# Patient Record
Sex: FEMALE | Race: BLACK OR AFRICAN AMERICAN | Employment: FULL TIME | URBAN - METROPOLITAN AREA
[De-identification: names, ages, dates, MRNs, and addresses within clinical notes are randomized per-mention and may not be internally consistent; named-entity substitution may affect disease eponyms.]

---

## 2018-09-17 ENCOUNTER — HOSPITAL ENCOUNTER (EMERGENCY)
Age: 33
Discharge: HOME | End: 2018-09-18
Attending: EMERGENCY MEDICINE
Payer: MEDICAID

## 2018-09-17 DIAGNOSIS — N76.0 BV (BACTERIAL VAGINOSIS): Primary | ICD-10-CM

## 2018-09-17 DIAGNOSIS — O46.90 VAGINAL BLEEDING IN PREGNANCY: ICD-10-CM

## 2018-09-17 DIAGNOSIS — O20.0 THREATENED MISCARRIAGE: ICD-10-CM

## 2018-09-17 DIAGNOSIS — B96.89 BV (BACTERIAL VAGINOSIS): Primary | ICD-10-CM

## 2018-09-17 DIAGNOSIS — R53.83 FATIGUE, UNSPECIFIED TYPE: ICD-10-CM

## 2018-09-17 LAB
ANION GAP BLD CALC-SCNC: 19 MMOL/L (ref 10–20)
APPEARANCE UR: ABNORMAL
BACTERIA URNS QL MICRO: NEGATIVE /HPF
BILIRUB UR QL: NEGATIVE
BUN BLD-MCNC: 4 MG/DL (ref 9–20)
CA-I BLD-MCNC: 1.21 MMOL/L (ref 1.12–1.32)
CHLORIDE BLD-SCNC: 98 MMOL/L (ref 98–107)
CLUE CELLS VAG QL WET PREP: NORMAL
CO2 BLD-SCNC: 23 MMOL/L (ref 21–32)
COLOR UR: ABNORMAL
CREAT BLD-MCNC: 0.5 MG/DL (ref 0.6–1.3)
EPITH CASTS URNS QL MICRO: ABNORMAL /LPF
GLUCOSE BLD-MCNC: 92 MG/DL (ref 65–100)
GLUCOSE UR STRIP.AUTO-MCNC: NEGATIVE MG/DL
HCG UR QL: POSITIVE
HCT VFR BLD CALC: 40 % (ref 35–47)
HGB UR QL STRIP: ABNORMAL
KETONES UR QL STRIP.AUTO: NEGATIVE MG/DL
KOH PREP SPEC: NORMAL
LEUKOCYTE ESTERASE UR QL STRIP.AUTO: NEGATIVE
NITRITE UR QL STRIP.AUTO: NEGATIVE
OTHER,OTHU: ABNORMAL
PH UR STRIP: 7.5 [PH] (ref 5–8)
POTASSIUM BLD-SCNC: 4 MMOL/L (ref 3.5–5.1)
PROT UR STRIP-MCNC: NEGATIVE MG/DL
RBC #/AREA URNS HPF: ABNORMAL /HPF (ref 0–5)
SERVICE CMNT-IMP: ABNORMAL
SERVICE CMNT-IMP: NORMAL
SODIUM BLD-SCNC: 135 MMOL/L (ref 136–145)
SP GR UR REFRACTOMETRY: 1.02 (ref 1–1.03)
T VAGINALIS VAG QL WET PREP: NORMAL
UA: UC IF INDICATED,UAUC: ABNORMAL
UROBILINOGEN UR QL STRIP.AUTO: 0.2 EU/DL (ref 0.2–1)
WBC URNS QL MICRO: ABNORMAL /HPF (ref 0–4)

## 2018-09-17 PROCEDURE — 80047 BASIC METABLC PNL IONIZED CA: CPT

## 2018-09-17 PROCEDURE — 87210 SMEAR WET MOUNT SALINE/INK: CPT | Performed by: PHYSICIAN ASSISTANT

## 2018-09-17 PROCEDURE — 99284 EMERGENCY DEPT VISIT MOD MDM: CPT

## 2018-09-17 PROCEDURE — 81025 URINE PREGNANCY TEST: CPT

## 2018-09-17 PROCEDURE — 81001 URINALYSIS AUTO W/SCOPE: CPT | Performed by: PHYSICIAN ASSISTANT

## 2018-09-17 PROCEDURE — 87491 CHLMYD TRACH DNA AMP PROBE: CPT | Performed by: PHYSICIAN ASSISTANT

## 2018-09-18 ENCOUNTER — APPOINTMENT (OUTPATIENT)
Dept: ULTRASOUND IMAGING | Age: 33
End: 2018-09-18
Attending: PHYSICIAN ASSISTANT
Payer: MEDICAID

## 2018-09-18 VITALS
HEIGHT: 61 IN | TEMPERATURE: 98.2 F | RESPIRATION RATE: 16 BRPM | BODY MASS INDEX: 22.84 KG/M2 | DIASTOLIC BLOOD PRESSURE: 72 MMHG | HEART RATE: 77 BPM | OXYGEN SATURATION: 99 % | SYSTOLIC BLOOD PRESSURE: 116 MMHG | WEIGHT: 121 LBS

## 2018-09-18 LAB
ERYTHROCYTE [DISTWIDTH] IN BLOOD BY AUTOMATED COUNT: 14.2 % (ref 11.5–14.5)
HCG SERPL-ACNC: ABNORMAL MIU/ML (ref 0–6)
HCT VFR BLD AUTO: 37.2 % (ref 35–47)
HGB BLD-MCNC: 12.5 G/DL (ref 11.5–16)
MCH RBC QN AUTO: 31.3 PG (ref 26–34)
MCHC RBC AUTO-ENTMCNC: 33.6 G/DL (ref 30–36.5)
MCV RBC AUTO: 93.2 FL (ref 80–99)
NRBC # BLD: 0 K/UL (ref 0–0.01)
NRBC BLD-RTO: 0 PER 100 WBC
PLATELET # BLD AUTO: 281 K/UL (ref 150–400)
PMV BLD AUTO: 9.2 FL (ref 8.9–12.9)
RBC # BLD AUTO: 3.99 M/UL (ref 3.8–5.2)
WBC # BLD AUTO: 9 K/UL (ref 3.6–11)

## 2018-09-18 PROCEDURE — 85027 COMPLETE CBC AUTOMATED: CPT | Performed by: PHYSICIAN ASSISTANT

## 2018-09-18 PROCEDURE — 96372 THER/PROPH/DIAG INJ SC/IM: CPT

## 2018-09-18 PROCEDURE — 36415 COLL VENOUS BLD VENIPUNCTURE: CPT | Performed by: PHYSICIAN ASSISTANT

## 2018-09-18 PROCEDURE — 76801 OB US < 14 WKS SINGLE FETUS: CPT

## 2018-09-18 PROCEDURE — 84702 CHORIONIC GONADOTROPIN TEST: CPT | Performed by: PHYSICIAN ASSISTANT

## 2018-09-18 PROCEDURE — 96374 THER/PROPH/DIAG INJ IV PUSH: CPT

## 2018-09-18 PROCEDURE — 74011250636 HC RX REV CODE- 250/636: Performed by: PHYSICIAN ASSISTANT

## 2018-09-18 RX ORDER — ONDANSETRON 4 MG/1
4 TABLET, ORALLY DISINTEGRATING ORAL
Qty: 10 TAB | Refills: 0 | Status: SHIPPED | OUTPATIENT
Start: 2018-09-18 | End: 2020-10-20 | Stop reason: ALTCHOICE

## 2018-09-18 RX ORDER — CLINDAMYCIN HYDROCHLORIDE 300 MG/1
300 CAPSULE ORAL 4 TIMES DAILY
Qty: 40 CAP | Refills: 0 | Status: SHIPPED | OUTPATIENT
Start: 2018-09-18 | End: 2020-10-20 | Stop reason: ALTCHOICE

## 2018-09-18 RX ORDER — CLINDAMYCIN HYDROCHLORIDE 300 MG/1
300 CAPSULE ORAL 2 TIMES DAILY
Qty: 14 CAP | Refills: 0 | Status: SHIPPED | OUTPATIENT
Start: 2018-09-18 | End: 2018-09-25

## 2018-09-18 RX ORDER — ONDANSETRON 2 MG/ML
4 INJECTION INTRAMUSCULAR; INTRAVENOUS
Status: COMPLETED | OUTPATIENT
Start: 2018-09-18 | End: 2018-09-18

## 2018-09-18 RX ADMIN — ONDANSETRON HYDROCHLORIDE 4 MG: 2 INJECTION, SOLUTION INTRAMUSCULAR; INTRAVENOUS at 00:10

## 2018-09-18 RX ADMIN — HUMAN RHO(D) IMMUNE GLOBULIN 0.05 MG: 300 INJECTION, SOLUTION INTRAMUSCULAR at 01:49

## 2018-09-18 NOTE — ED NOTES
Physician has not talked to pt since results of U/S. Pt asked for her discharge instructions and Rx. This RN advised her that I had not received discharge instructions and the physician needed to speak to her about her results from the U/S. Pt became verbally loud about the doctor not coming in to see her or talk to her. I advised the pt that I would go and ask the physician to come speak with her now. The pt became very hostile and walked out of the ED. Pt returned through the open door and asked again if I would give her the Rx. I advised her that I had to get them from the physician. Pt stated she was leaving. Pt was ask to left me that her IV out. Pt stated that she had already taken it out. I was unable to observe the IV sight because the pt had her sleeves pulled down. PT left ED. No IV cath was found in the room nor any sign of any bleeding. Pt left the hospital grounds.

## 2018-09-18 NOTE — ED PROVIDER NOTES
EMERGENCY DEPARTMENT HISTORY AND PHYSICAL EXAM 
 
 
Date: 2018 Patient Name: Tavo Fountain History of Presenting Illness Chief Complaint Patient presents with  Fatigue  
  with nausea, reports miscarriage last Thursday was 9 weeks pregnant  Vaginal Discharge  
  with odor History Provided By: Patient HPI: Tavo Fountain, 29 y.o. female with no significant PMHx, presents ambulatory to the ED with cc of odorous vaginal discharge and fatigue x 4 days that worsened today. Pt additionally c/o nausea, vomiting, chills, and diaphoresis. Pt reports she went to [facility] a week ago where they did an US and informed her she was 8 weeks pregnant last Monday. Pt discloses that she had a miscarriage that Thursday while at home but was not seen at that time. Pt reports having vaginal bleeding for that one day, she had some spotting prior but none over the weekend or currently. Pt discloses she a f/u appointment in 3 days. Pt doesn't remember her LMP and discloses she's had an irregular cycle for the past 7 months. Pt denies HA, CP, SOB, cough, abdominal pain, diarrhea, or urinary sxs. There are no other complaints, changes, or physical findings at this time. PCP: None Past History Past Medical History: 
History reviewed. No pertinent past medical history. Past Surgical History: 
Past Surgical History:  
Procedure Laterality Date  HX GYN    
   Family History: 
History reviewed. No pertinent family history. Social History: 
Social History Substance Use Topics  Smoking status: Never Smoker  Smokeless tobacco: Never Used  Alcohol use Yes Comment: rare when not pregnant Allergies: Allergies Allergen Reactions  Latex Rash  Sulfa (Sulfonamide Antibiotics) Hives Review of Systems Review of Systems Constitutional: Positive for chills, diaphoresis and fatigue. Negative for fever. HENT: Negative for sore throat. Eyes: Negative for photophobia and redness. Respiratory: Negative for shortness of breath and wheezing. Cardiovascular: Negative for chest pain and leg swelling. Gastrointestinal: Positive for nausea and vomiting. Negative for abdominal pain and blood in stool. Genitourinary: Positive for vaginal discharge. Negative for difficulty urinating, dysuria, hematuria, menstrual problem and vaginal bleeding. Musculoskeletal: Negative for back pain and joint swelling. Neurological: Negative for dizziness, seizures, syncope, speech difficulty, weakness, numbness and headaches. Hematological: Negative for adenopathy. Psychiatric/Behavioral: Negative for agitation, confusion and suicidal ideas. The patient is not nervous/anxious. All other systems reviewed and are negative. Physical Exam  
Physical Exam  
Constitutional: She is oriented to person, place, and time. She appears well-developed and well-nourished. No distress. HENT:  
Head: Normocephalic and atraumatic. Eyes: Conjunctivae are normal.  
Cardiovascular: Normal rate, regular rhythm and normal heart sounds. Pulmonary/Chest: Effort normal and breath sounds normal. No respiratory distress. She has no wheezes. She has no rales. Abdominal: Soft. Bowel sounds are normal. She exhibits no distension. There is tenderness in the suprapubic area. There is no rebound and no guarding. Genitourinary: Pelvic exam was performed with patient supine. Cervix exhibits discharge (foul blood tinged whitish discharge). Cervix exhibits no motion tenderness. Right adnexum displays no tenderness. Left adnexum displays no tenderness. Vaginal discharge found. Neurological: She is alert and oriented to person, place, and time. Skin: Skin is warm and dry. Psychiatric: She has a normal mood and affect. Her behavior is normal. Judgment and thought content normal.  
Nursing note and vitals reviewed.  
at 12:35 AM 
 
 Diagnostic Study Results Labs - Recent Results (from the past 12 hour(s)) POC CHEM8 Collection Time: 09/17/18 11:05 PM  
Result Value Ref Range Calcium, ionized (POC) 1.21 1.12 - 1.32 mmol/L Sodium (POC) 135 (L) 136 - 145 mmol/L Potassium (POC) 4.0 3.5 - 5.1 mmol/L Chloride (POC) 98 98 - 107 mmol/L  
 CO2 (POC) 23 21 - 32 mmol/L Anion gap (POC) 19 10 - 20 mmol/L Glucose (POC) 92 65 - 100 mg/dL BUN (POC) 4 (L) 9 - 20 mg/dL Creatinine (POC) 0.5 (L) 0.6 - 1.3 mg/dL GFRAA, POC >60 >60 ml/min/1.73m2 GFRNA, POC >60 >60 ml/min/1.73m2 Hematocrit (POC) 40 35.0 - 47.0 % Comment Comment Not Indicated. DOROTHY, OTHER SOURCES Collection Time: 09/17/18 11:15 PM  
Result Value Ref Range Special Requests: NO SPECIAL REQUESTS    
 KOH NO YEAST SEEN    
WET PREP Collection Time: 09/17/18 11:15 PM  
Result Value Ref Range Clue cells CLUE CELLS PRESENT Wet prep NO TRICHOMONAS SEEN    
URINALYSIS W/ REFLEX CULTURE Collection Time: 09/17/18 11:15 PM  
Result Value Ref Range Color YELLOW/STRAW Appearance CLOUDY (A) CLEAR Specific gravity 1.020 1.003 - 1.030    
 pH (UA) 7.5 5.0 - 8.0 Protein NEGATIVE  NEG mg/dL Glucose NEGATIVE  NEG mg/dL Ketone NEGATIVE  NEG mg/dL Bilirubin NEGATIVE  NEG Blood SMALL (A) NEG Urobilinogen 0.2 0.2 - 1.0 EU/dL Nitrites NEGATIVE  NEG Leukocyte Esterase NEGATIVE  NEG    
 WBC 0-4 0 - 4 /hpf  
 RBC 0-5 0 - 5 /hpf Epithelial cells FEW FEW /lpf Bacteria NEGATIVE  NEG /hpf  
 UA:UC IF INDICATED CULTURE NOT INDICATED BY UA RESULT CNI Other: Renal Epithelial cells Present HCG URINE, QL. - POC Collection Time: 09/17/18 11:33 PM  
Result Value Ref Range Pregnancy test,urine (POC) POSITIVE (A) NEG    
CBC W/O DIFF Collection Time: 09/18/18 12:09 AM  
Result Value Ref Range WBC 9.0 3.6 - 11.0 K/uL  
 RBC 3.99 3.80 - 5.20 M/uL  
 HGB 12.5 11.5 - 16.0 g/dL HCT 37.2 35.0 - 47.0 % MCV 93.2 80.0 - 99.0 FL  
 MCH 31.3 26.0 - 34.0 PG  
 MCHC 33.6 30.0 - 36.5 g/dL  
 RDW 14.2 11.5 - 14.5 % PLATELET 226 453 - 605 K/uL MPV 9.2 8.9 - 12.9 FL  
 NRBC 0.0 0  WBC ABSOLUTE NRBC 0.00 0.00 - 0.01 K/uL BETA HCG, QT Collection Time: 09/18/18 12:09 AM  
Result Value Ref Range Beta HCG, QT >911758 (H) 0 - 6 MIU/ML  
RH IMMUNE GLOBULIN PROPHYLACTIC Collection Time: 09/18/18 12:15 AM  
Result Value Ref Range No. of weeks gestation 8 Unit number [id number] Blood component type RH IMMUNE GLOBULIN Unit division 00 Status of unit ISSUED Radiologic Studies -  
US PREG UTS < 14 WKS SNGL Final Result CT Results  (Last 48 hours) None CXR Results  (Last 48 hours) None Medical Decision Making I am the first provider for this patient. I reviewed the vital signs, available nursing notes, past medical history, past surgical history, family history and social history. Vital Signs-Reviewed the patient's vital signs. Patient Vitals for the past 12 hrs: 
 Temp Pulse Resp BP SpO2  
09/18/18 0150 - 77 16 116/72 99 % 09/17/18 2234 98.2 °F (36.8 °C) 75 16 110/61 98 % Records Reviewed: Nursing Notes and Old Medical Records Provider Notes (Medical Decision Making): DDx: anemia, UTI, STI, retained POC, STI, BV 
 
ED Course:  
Initial assessment performed. The patients presenting problems have been discussed, and they are in agreement with the care plan formulated and outlined with them. I have encouraged them to ask questions as they arise throughout their visit. 12:00 AM 
Pt initially refusing another stick as initial CBC ordered overlooked. Had long discussion with pt about her symptoms and f/u care. Pt advise we can do U/S now but there is wait to call in tech and blood work needed to check anemia.    Pt does report hx of retained POC in past.  Pt offered U/S now or with her f/u. Pt reports blood type O neg. And states she has had rhogam before. Pt is a A3 
 
1:07 AM 
Pt to U/S, care turned over to attending, Dr Kasey Nevarez at this time who will f/u on dispo and plan Critical Care Time: 0 Minutes Disposition: PLAN: 
1. The patient eloped. 2:50 AM 
The patient, at this time, has decided to leave the ED unsupervised, unnoticed and/or prior to his/her scheduled discharge. PLAN: 
1. Current Discharge Medication List  
  
START taking these medications Details  
clindamycin (CLEOCIN) 300 mg capsule Take 1 Cap by mouth two (2) times a day for 7 days. Qty: 14 Cap, Refills: 0  
  
  
 
2. Follow-up Information Follow up With Details Comments Contact Tristan Lambert Schedule an appointment as soon as possible for a visit on 2018  [address] [place] [address]. [address] [place] [id number] [phone] Fabi Michael MD Schedule an appointment as soon as possible for a visit in 1 day  [address] [place] [address] 
[phone] Return to ED if worse Diagnosis Clinical Impression: 1. BV (bacterial vaginosis) 2. Fatigue, unspecified type 3. Vaginal bleeding in pregnancy 4. Threatened miscarriage Attestations: This note is prepared by Tracey Dunn, acting as Scribe for [employer]. [employer]: The scribe's documentation has been prepared under my direction and personally reviewed by me in its entirety. I confirm that the note above accurately reflects all work, treatment, procedures, and medical decision making performed by me.

## 2018-09-18 NOTE — ED TRIAGE NOTES
I was in this chart to answer a patient phone call and print her AVS.  I was not able to print from [employer] computer so [address]

## 2018-09-18 NOTE — DISCHARGE INSTRUCTIONS
Bacterial Vaginosis: Care Instructions  Your Care Instructions    Bacterial vaginosis is a type of vaginal infection. It is caused by excess growth of certain bacteria that are normally found in the vagina. Symptoms can include itching, swelling, pain when you urinate or have sex, and a gray or yellow discharge with a \"fishy\" odor. It is not considered an infection that is spread through sexual contact. Although symptoms can be annoying and uncomfortable, bacterial vaginosis does not usually cause other health problems. However, if you have it while you are pregnant, it can cause complications. While the infection may go away on its own, most doctors use antibiotics to treat it. You may have been prescribed pills or vaginal cream. With treatment, bacterial vaginosis usually clears up in 5 to 7 days. Follow-up care is a key part of your treatment and safety. Be sure to make and go to all appointments, and call your doctor if you are having problems. It's also a good idea to know your test results and keep a list of the medicines you take. How can you care for yourself at home? · Take your antibiotics as directed. Do not stop taking them just because you feel better. You need to take the full course of antibiotics. · Do not eat or drink anything that contains alcohol if you are taking metronidazole (Flagyl). · Keep using your medicine if you start your period. Use pads instead of tampons while using a vaginal cream or suppository. Tampons can absorb the medicine. · Wear loose cotton clothing. Do not wear nylon and other materials that hold body heat and moisture close to the skin. · Do not scratch. Relieve itching with a cold pack or a cool bath. · Do not wash your vaginal area more than once a day. Use plain water or a mild, unscented soap. Do not douche. When should you call for help?   Watch closely for changes in your health, and be sure to contact your doctor if:    · You have unexpected vaginal bleeding.     · You have a fever.     · You have new or increased pain in your vagina or pelvis.     · You are not getting better after 1 week.     · Your symptoms return after you finish the course of your medicine. Where can you learn more? Go to http://brennen-amberly.info/. Lebron Falcon in the search box to learn more about \"Bacterial Vaginosis: Care Instructions. \"  Current as of: October 6, 2017  Content Version: 11.7  © 5618-0596 [employer]. Care instructions adapted under license by [employer] (which disclaims liability or warranty for this information). If you have questions about a medical condition or this instruction, always ask your healthcare professional. [address] any warranty or liability for your use of this information. Fatigue: Care Instructions  Your Care Instructions    Fatigue is a feeling of tiredness, exhaustion, or lack of energy. You may feel fatigue because of too much or not enough activity. It can also come from stress, lack of sleep, boredom, and poor diet. Many medical problems, such as viral infections, can cause fatigue. Emotional problems, especially depression, are often the cause of fatigue. Fatigue is most often a symptom of another problem. Treatment for fatigue depends on the cause. For example, if you have fatigue because you have a certain health problem, treating this problem also treats your fatigue. If depression or anxiety is the cause, treatment may help. Follow-up care is a key part of your treatment and safety. Be sure to make and go to all appointments, and call your doctor if you are having problems. It's also a good idea to know your test results and keep a list of the medicines you take. How can you care for yourself at home? · Get regular exercise. But don't overdo it. Go back and forth between rest and exercise. · Get plenty of rest.  · Eat a healthy diet.  Do not skip meals, especially breakfast.  · Reduce your use of caffeine, tobacco, and alcohol. Caffeine is most often found in coffee, tea, cola drinks, and chocolate. · Limit medicines that can cause fatigue. This includes tranquilizers and cold and allergy medicines. When should you call for help? Watch closely for changes in your health, and be sure to contact your doctor if:    · You have new symptoms such as fever or a rash.     · Your fatigue gets worse.     · You have been feeling down, depressed, or hopeless. Or you may have lost interest in things that you usually enjoy.     · You are not getting better as expected. Where can you learn more? Go to http://brennen"[employer]amberly.info/. Enter L494 in the search box to learn more about \"Fatigue: Care Instructions. \"  Current as of: November 20, 2017  Content Version: 11.7  © 5553-6442 [employer]. Care instructions adapted under license by [employer] (which disclaims liability or warranty for this information). If you have questions about a medical condition or this instruction, always ask your healthcare professional. [address] any warranty or liability for your use of this information. Miscarriage: Care Instructions  Your Care Instructions    The loss of a pregnancy can be very hard. You may wonder why it happened or blame yourself. Miscarriages are common and are not caused by exercise, stress, or sex. Most happen because the fertilized egg in the uterus does not develop normally. There is no treatment that can stop a miscarriage. As long as you do not have heavy blood loss, fever, weakness, or other signs of infection, you can let a miscarriage follow its own course. This can take several days. Your body will recover over the next several weeks. Having a miscarriage does not mean you cannot have a normal pregnancy in the future. The doctor has checked you carefully, but problems can develop later.  If you notice any problems or new symptoms, get medical treatment right away. Follow-up care is a key part of your treatment and safety. Be sure to make and go to all appointments, and call your doctor if you are having problems. It's also a good idea to know your test results and keep a list of the medicines you take. How can you care for yourself at home? · You will probably have some vaginal bleeding for 1 to 2 weeks. It may be similar to or slightly heavier than a normal period. The bleeding should get lighter after a week. Use pads instead of tampons. You may use tampons during your next period, which should start in 3 to 6 weeks. · Take an over-the-counter pain medicine, such as acetaminophen (Tylenol), ibuprofen (Advil, Motrin), or naproxen (Aleve) for cramps. Read and follow all instructions on the label. You may have cramps for several days after the miscarriage. · Do not take two or more pain medicines at the same time unless the doctor told you to. Many pain medicines have acetaminophen, which is Tylenol. Too much acetaminophen (Tylenol) can be harmful. · Use a clear container to save any tissue that you pass. Take it to your doctor's office as soon as you can. · Do not have sex until the bleeding stops. · You may return to your normal activities if you feel well enough to do so. But you should avoid heavy exercise until the bleeding stops. · If you plan to get pregnant again, check with your doctor. Most doctors suggest waiting until you have had at least one normal period before you try to get pregnant. · If you do not want to get pregnant, ask your doctor about birth control. You can get pregnant again before your next period starts if you are not using birth control. · You may be low in iron because of blood loss. Eat a balanced diet that is high in iron and vitamin C. Foods rich in iron include red meat, shellfish, eggs, beans, and leafy green vegetables.  Foods high in vitamin C include citrus fruits, tomatoes, and broccoli. Talk to your doctor about whether you need to take iron pills or a multivitamin. · The loss of a pregnancy can be very hard. You may wonder why it happened and blame yourself. Talking to family members, friends, a counselor, or your doctor may help you cope with your loss. When should you call for help? Call 911 anytime you think you may need emergency care. For example, call if:    · You passed out (lost consciousness).    Call your doctor now or seek immediate medical care if:    · You have severe vaginal bleeding.     · You are dizzy or lightheaded, or you feel like you may faint.     · You have new or worse pain in your belly or pelvis.     · You have a fever.     · You have vaginal discharge that smells bad.    Watch closely for changes in your health, and be sure to contact your doctor if:    · You do not get better as expected. Where can you learn more? Go to http://brennen-amberly.info/. Enter E802 in the search box to learn more about \"Miscarriage: Care Instructions. \"  Current as of: November 21, 2017  Content Version: 11.7  © 6686-6816 [employer]. Care instructions adapted under license by [employer] (which disclaims liability or warranty for this information). If you have questions about a medical condition or this instruction, always ask your healthcare professional. [address] any warranty or liability for your use of this information.

## 2018-09-18 NOTE — ED NOTES
Pt sts feeling weak, nausea. Having foul vaginal discharge. Pt sts that she had a miscarriage at home on Thursday. Sts that she passes possible fetal tissue. Pt sts that she flush the tissue down the toilet. Pt sts that she was nine weeks gustation. Pt sts that she is not longer bleeding. Emergency Department Nursing Plan of Care The Nursing Plan of Care is developed from the Nursing assessment and Emergency Department Attending provider initial evaluation. The plan of care may be reviewed in the ED Provider note. The Plan of Care was developed with the following considerations:  
Patient / Family readiness to learn indicated by:verbalized understanding Persons(s) to be included in education: patient Barriers to Learning/Limitations:No 
 
Signed Henry Jones RN   
9/17/2018   10:49 PM

## 2018-09-18 NOTE — ED NOTES
Ultrasound tech arrived for pt. Pt advised U/S tech that she did not want a Trans Vag U/S. RN and U/S tech explained why Trans/Vag U/S was required. Pt refused. Trans Vag U/S. Tech sts that she will attempt to get required view via pelvis U/S. Thanh Foreman PA-C advised. Pt to U/S via wheelchair.

## 2018-09-19 LAB
BLD PROD TYP BPU: NORMAL
BPU ID: NORMAL
C TRACH DNA SPEC QL NAA+PROBE: NEGATIVE
GA (WEEKS): 8 WK
N GONORRHOEA DNA SPEC QL NAA+PROBE: NEGATIVE
SAMPLE TYPE: NORMAL
SERVICE CMNT-IMP: NORMAL
SPECIMEN SOURCE: NORMAL
STATUS OF UNIT,%ST: NORMAL
UNIT DIVISION, %UDIV: 0

## 2019-04-10 ENCOUNTER — HOSPITAL ENCOUNTER (EMERGENCY)
Age: 34
Discharge: LEFT BEFORE DISCHARGE | End: 2019-04-10
Attending: EMERGENCY MEDICINE
Payer: MEDICAID

## 2019-04-10 ENCOUNTER — APPOINTMENT (OUTPATIENT)
Dept: GENERAL RADIOLOGY | Age: 34
End: 2019-04-10
Attending: EMERGENCY MEDICINE
Payer: MEDICAID

## 2019-04-10 VITALS
TEMPERATURE: 98.1 F | WEIGHT: 128 LBS | RESPIRATION RATE: 19 BRPM | DIASTOLIC BLOOD PRESSURE: 80 MMHG | HEIGHT: 61 IN | SYSTOLIC BLOOD PRESSURE: 121 MMHG | HEART RATE: 85 BPM | OXYGEN SATURATION: 98 % | BODY MASS INDEX: 24.17 KG/M2

## 2019-04-10 LAB — HCG UR QL: NEGATIVE

## 2019-04-10 PROCEDURE — 75810000275 HC EMERGENCY DEPT VISIT NO LEVEL OF CARE

## 2019-04-10 PROCEDURE — 71046 X-RAY EXAM CHEST 2 VIEWS: CPT

## 2019-04-10 PROCEDURE — 81025 URINE PREGNANCY TEST: CPT

## 2019-04-10 RX ORDER — IPRATROPIUM BROMIDE AND ALBUTEROL SULFATE 2.5; .5 MG/3ML; MG/3ML
3 SOLUTION RESPIRATORY (INHALATION)
Status: DISCONTINUED | OUTPATIENT
Start: 2019-04-10 | End: 2019-04-10 | Stop reason: HOSPADM

## 2019-04-10 NOTE — ED TRIAGE NOTES
Per pt reports productive cough with green sputum that is causing chest soreness, +wheezing, hx of seasonal asthma and bronchitis.

## 2019-04-13 ENCOUNTER — HOSPITAL ENCOUNTER (EMERGENCY)
Age: 34
Discharge: LEFT BEFORE DISCHARGE | End: 2019-04-13
Attending: EMERGENCY MEDICINE
Payer: MEDICAID

## 2019-04-13 VITALS
WEIGHT: 124.78 LBS | HEART RATE: 92 BPM | OXYGEN SATURATION: 100 % | SYSTOLIC BLOOD PRESSURE: 142 MMHG | DIASTOLIC BLOOD PRESSURE: 84 MMHG | RESPIRATION RATE: 16 BRPM | BODY MASS INDEX: 23.56 KG/M2 | TEMPERATURE: 98.3 F | HEIGHT: 61 IN

## 2019-04-13 PROCEDURE — 75810000275 HC EMERGENCY DEPT VISIT NO LEVEL OF CARE

## 2020-10-20 ENCOUNTER — OFFICE VISIT (OUTPATIENT)
Dept: FAMILY MEDICINE CLINIC | Age: 35
End: 2020-10-20
Payer: MEDICAID

## 2020-10-20 VITALS
WEIGHT: 118 LBS | RESPIRATION RATE: 16 BRPM | DIASTOLIC BLOOD PRESSURE: 69 MMHG | TEMPERATURE: 97.9 F | HEIGHT: 61 IN | OXYGEN SATURATION: 99 % | SYSTOLIC BLOOD PRESSURE: 107 MMHG | BODY MASS INDEX: 22.28 KG/M2 | HEART RATE: 83 BPM

## 2020-10-20 DIAGNOSIS — R63.4 WEIGHT LOSS: ICD-10-CM

## 2020-10-20 DIAGNOSIS — F33.0 MILD EPISODE OF RECURRENT MAJOR DEPRESSIVE DISORDER (HCC): ICD-10-CM

## 2020-10-20 DIAGNOSIS — Z00.00 ANNUAL PHYSICAL EXAM: Primary | ICD-10-CM

## 2020-10-20 DIAGNOSIS — F12.10 MARIJUANA ABUSE: ICD-10-CM

## 2020-10-20 DIAGNOSIS — R61 NIGHT SWEATS: ICD-10-CM

## 2020-10-20 PROCEDURE — 99204 OFFICE O/P NEW MOD 45 MIN: CPT | Performed by: NURSE PRACTITIONER

## 2020-10-20 RX ORDER — BUPROPION HYDROCHLORIDE 150 MG/1
TABLET, EXTENDED RELEASE ORAL
COMMUNITY
Start: 2020-09-08 | End: 2020-10-20 | Stop reason: ALTCHOICE

## 2020-10-20 RX ORDER — SERTRALINE HYDROCHLORIDE 50 MG/1
TABLET, FILM COATED ORAL
COMMUNITY
Start: 2020-07-24 | End: 2020-10-20 | Stop reason: ALTCHOICE

## 2020-10-20 RX ORDER — METRONIDAZOLE 500 MG/1
TABLET ORAL
COMMUNITY
Start: 2020-07-24 | End: 2020-10-20 | Stop reason: ALTCHOICE

## 2020-10-20 NOTE — PROGRESS NOTES
Identified pt with two pt identifiers(name and ). Reviewed record in preparation for visit and have obtained necessary documentation. Chief Complaint   Patient presents with    Other     night sweats, every night    Complete Physical    Other     not eating well, pt would like to gain weight        Health Maintenance Due   Topic    DTaP/Tdap/Td series (1 - Tdap)    PAP AKA CERVICAL CYTOLOGY     Flu Vaccine (1)       Visit Vitals  Blood Pressure 107/69 (BP 1 Location: Left arm, BP Patient Position: Sitting)   Pulse 83   Temperature 97.9 °F (36.6 °C) (Oral)   Respiration 16   Height 5' 1\" (1.549 m)   Weight 118 lb (53.5 kg)   Oxygen Saturation 99%   Body Mass Index 22.30 kg/m²         Coordination of Care Questionnaire:  :   1) Have you been to an emergency room, urgent care, or hospitalized since your last visit? NO      2. Have seen or consulted any other health care provider since your last visit? NO          Patient is accompanied by  I have received verbal consent from Mervin Bruce to discuss any/all medical information while they are present in the room.
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
